# Patient Record
Sex: FEMALE | Race: WHITE | NOT HISPANIC OR LATINO | Employment: FULL TIME | URBAN - METROPOLITAN AREA
[De-identification: names, ages, dates, MRNs, and addresses within clinical notes are randomized per-mention and may not be internally consistent; named-entity substitution may affect disease eponyms.]

---

## 2024-10-01 ENCOUNTER — PATIENT ROUNDING (BHMG ONLY) (OUTPATIENT)
Dept: FAMILY MEDICINE CLINIC | Facility: CLINIC | Age: 59
End: 2024-10-01

## 2024-10-01 ENCOUNTER — OFFICE VISIT (OUTPATIENT)
Dept: FAMILY MEDICINE CLINIC | Facility: CLINIC | Age: 59
End: 2024-10-01
Payer: COMMERCIAL

## 2024-10-01 VITALS
DIASTOLIC BLOOD PRESSURE: 69 MMHG | HEART RATE: 107 BPM | WEIGHT: 148 LBS | HEIGHT: 67 IN | TEMPERATURE: 98.2 F | OXYGEN SATURATION: 94 % | BODY MASS INDEX: 23.23 KG/M2 | SYSTOLIC BLOOD PRESSURE: 97 MMHG

## 2024-10-01 DIAGNOSIS — Z13.220 SCREENING FOR CHOLESTEROL LEVEL: ICD-10-CM

## 2024-10-01 DIAGNOSIS — J30.1 SEASONAL ALLERGIC RHINITIS DUE TO POLLEN: Primary | ICD-10-CM

## 2024-10-01 DIAGNOSIS — Z13.0 SCREENING FOR DEFICIENCY ANEMIA: ICD-10-CM

## 2024-10-01 DIAGNOSIS — Z13.1 SCREENING FOR DIABETES MELLITUS: ICD-10-CM

## 2024-10-01 PROCEDURE — 99204 OFFICE O/P NEW MOD 45 MIN: CPT | Performed by: FAMILY MEDICINE

## 2024-10-01 RX ORDER — MAGNESIUM GLUCONATE 27 MG(500)
500 TABLET ORAL 2 TIMES DAILY
COMMUNITY

## 2024-10-01 RX ORDER — CHOLECALCIFEROL (VITAMIN D3) 25 MCG
1000 TABLET ORAL DAILY
COMMUNITY

## 2024-10-01 RX ORDER — PHENOL 1.4 %
600 AEROSOL, SPRAY (ML) MUCOUS MEMBRANE DAILY
COMMUNITY

## 2024-10-01 RX ORDER — MULTIPLE VITAMINS W/ MINERALS TAB 9MG-400MCG
1 TAB ORAL DAILY
COMMUNITY

## 2024-10-01 NOTE — PROGRESS NOTES
Subjective   Rozina Crook is a 59 y.o. female.   Chief Complaint   Patient presents with    [facility]       History of Present Illness   59 y.o. female presents to the office today as a new patient to me.  No prior records in the chart.  History of Present Illness  The patient is a 59-year-old female who presents to [facility].    She reports overall good health and infrequent doctor visits, typically only for blood work. She rarely falls ill.    Last summer, she experienced a persistent cough and sought treatment at an urgent care facility in [place], where she was prescribed Zithromax. Despite multiple negative COVID-19 tests, the cough persisted, leading her to believe it was bronchitis. A similar episode occurred a year prior, also treated with Zithromax.  The cough and other symptoms have subsequently resolved completely.    She has allergies but does not take any prescription medications. In December 2023, she developed a rash that lasted until April 2024, which worsened after taking doxycycline prescribed by Dr. Saul. A dermatologist, Dr. Victoria, diagnosed the rash as hives and suggested a possible allergy to doxycycline.    Her medical history includes a tubal ligation in 2022, tonsil and adenoid removal at age 3, and an osteochondroma removal from her left knee at age 20. Her gynecologist manages her Pap smears and mammograms. She completed a Cologuard test in 2023, which was negative.    Her total cholesterol was 201 last year, despite daily exercise and a low-fat diet. Her liver enzymes have been slightly elevated on several occasions, around 35 or 36. She consumes alcohol moderately and avoids Tylenol. Two years ago, she visited the ER due to concerns of a blood clot, but tests were negative. She believes her high glucose level was due to eating a cinnamon roll before the test. Her HDL was high and LDL was low. Her blood pressure has always been low.    SOCIAL HISTORY  She does not  "smoke.    FAMILY HISTORY  Her mother had blood clots when she had COVID-19.    ALLERGIES  She is allergic to DOXYCYCLINE and PENICILLIN.      Patient Active Problem List    Diagnosis Date Noted    Seasonal allergic rhinitis due to pollen 10/01/2024           Past Surgical History:   Procedure Laterality Date    ADENOIDECTOMY  1968    KNEE SURGERY Left     osteochondroma 20 or 22 yo    TONSILLECTOMY  1968    TUBAL ABDOMINAL LIGATION  March 2002     Current Outpatient Medications on File Prior to Visit   Medication Sig    calcium carbonate (OS-LENY) 600 MG tablet Take 1 tablet by mouth Daily.    Cholecalciferol 25 MCG (1000 UT) tablet Take 1 tablet by mouth Daily.    magnesium gluconate (MAGONATE) 500 MG tablet Take 1 tablet by mouth 2 (Two) Times a Day.    multivitamin with minerals tablet tablet Take 1 tablet by mouth Daily.     No current facility-administered medications on file prior to visit.     Allergies   Allergen Reactions    Doxycycline Anaphylaxis    Penicillins Anaphylaxis     Social History     Socioeconomic History    Marital status:    Tobacco Use    Smoking status: Never     Passive exposure: Never    Smokeless tobacco: Never   Vaping Use    Vaping status: Never Used   Substance and Sexual Activity    Alcohol use: Yes     Alcohol/week: 4.0 standard drinks of alcohol     Types: 4 Cans of beer per week    Drug use: Never    Sexual activity: Yes     Partners: Male     Family History   Problem Relation Age of Onset    Hearing loss Mother     Hyperlipidemia Mother        Review of Systems    Objective   BP 97/69 (BP Location: Right arm, Patient Position: Sitting, Cuff Size: Adult)   Pulse 107   Temp 98.2 °F (36.8 °C) (Infrared)   Ht 170.2 cm (67\")   Wt 67.1 kg (148 lb)   LMP  (Exact Date)   SpO2 94%   BMI 23.18 kg/m²   Physical Exam  Constitutional:       General: She is not in acute distress.     Appearance: She is well-developed.      Comments:      HENT:      Head: Normocephalic and " atraumatic.   Eyes:      Conjunctiva/sclera: Conjunctivae normal.   Cardiovascular:      Rate and Rhythm: Normal rate and regular rhythm.      Heart sounds: No murmur heard.  Pulmonary:      Effort: Pulmonary effort is normal. No respiratory distress.      Breath sounds: Normal breath sounds.   Musculoskeletal:         General: Normal range of motion.      Cervical back: Normal range of motion.      Right lower leg: No edema.      Left lower leg: No edema.   Skin:     General: Skin is warm and dry.      Findings: No rash.   Neurological:      Mental Status: She is alert and oriented to person, place, and time.      Gait: Gait normal.   Psychiatric:         Mood and Affect: Mood normal.         Behavior: Behavior normal.       Physical Exam  Heart sounds are normal.      No visits with results within 4 Month(s) from this visit.   Latest known visit with results is:   No results found for any previous visit.     Results  Laboratory Studies  Total cholesterol was 201. Liver enzymes slightly elevated around 35-36.    BMI cannot be calculated due to outdated height or weight values.  Please input a current height/weight in Vitals and re-renter BMIFOLLOWUP in Note to pull in correct documentation based on BMI range.     Assessment & Plan   Diagnoses and all orders for this visit:    1. Seasonal allergic rhinitis due to pollen (Primary)    2. Screening for cholesterol level  -     Lipid Panel    3. Screening for diabetes mellitus  -     Comprehensive Metabolic Panel  -     Hemoglobin A1c    4. Screening for deficiency anemia  -     CBC & Differential      Assessment & Plan  1. Allergic Rhinitis.  She reports having allergies which may exacerbate respiratory infections. No prescription medications are currently taken for this condition.     2. History of Bronchitis.  She experienced a persistent cough last summer, treated with Zithromax at an urgent care facility. A similar episode occurred a year prior, also treated with  Zithromax. No current symptoms of bronchitis are reported.    3. Elevated Cholesterol.  Her total cholesterol was 201 last year. She was advised to exercise more and avoid fatty foods, although she already exercises daily and avoids fatty foods. Screening for high cholesterol will be performed today.    4. Slightly Elevated Liver Enzymes.  She has had slightly elevated liver enzymes (35-36) in the past. She drinks alcohol occasionally and avoids Tylenol. Screening for liver disease will be performed today.    5. Health Maintenance.  She had a negative Cologuard test in 2023. Another Cologuard test is recommended in 2026. Blood work will be done today to screen for anemia, diabetes, kidney disease, liver disease, and high cholesterol.    Keep follow-up with her gynecologist for cervical cancer and breast cancer screening.  I will follow-up with her when the results of the above tests are back and we will see her again at least in 1 year or sooner if needed.      Call with any problems or concerns before next visit       Return in about 1 year (around 10/1/2025).  Patient or patient representative verbalized consent for the use of Ambient Listening during the visit with  Albina Bailey MD for chart documentation. 10/1/2024  12:44 EDT    Much of this report is an electronic transcription of spoken language to printed text using Dragon dictation software.  As such, the subtleties and finesse of spoken language may permit erroneous, or at times, nonsensical words or phrases to be inadvertently transcribed; thus changes may be made at a later date to rectify these errors.     Albina Bailey MD10/1/148798:44 EDT  This note has been electronically signed

## 2024-10-01 NOTE — PROGRESS NOTES
October 1, 2024    Hello, may I speak with Rozina Crook?    My name is BEATRIZ      I am  with [facility]  [facility]  [address]  [place] [address].    Before we get started may I verify your date of birth? 1965    I am calling to officially welcome you to our practice and ask about your recent visit. Is this a good time to talk? yes    Tell me about your visit with us. What things went well?  EVERYTHING WENT GREAT        We're always looking for ways to make our patients' experiences even better. Do you have recommendations on ways we may improve?  no    Overall were you satisfied with your first visit to our practice? yes       I appreciate you taking the time to speak with me today. Is there anything else I can do for you? no      Thank you, and have a great day.

## 2024-10-02 LAB
ALBUMIN SERPL-MCNC: 4.8 G/DL (ref 3.8–4.9)
ALP SERPL-CCNC: 81 IU/L (ref 44–121)
ALT SERPL-CCNC: 31 IU/L (ref 0–32)
AST SERPL-CCNC: 32 IU/L (ref 0–40)
BASOPHILS # BLD AUTO: 0.1 X10E3/UL (ref 0–0.2)
BASOPHILS NFR BLD AUTO: 1 %
BILIRUB SERPL-MCNC: 0.5 MG/DL (ref 0–1.2)
BUN SERPL-MCNC: 14 MG/DL (ref 8–27)
BUN/CREAT SERPL: 16 (ref 12–28)
CALCIUM SERPL-MCNC: 10.3 MG/DL (ref 8.7–10.3)
CHLORIDE SERPL-SCNC: 103 MMOL/L (ref 96–106)
CHOLEST SERPL-MCNC: 208 MG/DL (ref 100–199)
CO2 SERPL-SCNC: 23 MMOL/L (ref 20–29)
CREAT SERPL-MCNC: 0.9 MG/DL (ref 0.57–1)
EGFRCR SERPLBLD CKD-EPI 2021: 73 ML/MIN/1.73
EOSINOPHIL # BLD AUTO: 0.1 X10E3/UL (ref 0–0.4)
EOSINOPHIL NFR BLD AUTO: 2 %
ERYTHROCYTE [DISTWIDTH] IN BLOOD BY AUTOMATED COUNT: 12.5 % (ref 11.7–15.4)
GLOBULIN SER CALC-MCNC: 2.2 G/DL (ref 1.5–4.5)
GLUCOSE SERPL-MCNC: 99 MG/DL (ref 70–99)
HBA1C MFR BLD: 5.5 % (ref 4.8–5.6)
HCT VFR BLD AUTO: 43.1 % (ref 34–46.6)
HDLC SERPL-MCNC: 85 MG/DL
HGB BLD-MCNC: 14.4 G/DL (ref 11.1–15.9)
IMM GRANULOCYTES # BLD AUTO: 0 X10E3/UL (ref 0–0.1)
IMM GRANULOCYTES NFR BLD AUTO: 0 %
LDLC SERPL CALC-MCNC: 111 MG/DL (ref 0–99)
LYMPHOCYTES # BLD AUTO: 0.9 X10E3/UL (ref 0.7–3.1)
LYMPHOCYTES NFR BLD AUTO: 14 %
MCH RBC QN AUTO: 31.4 PG (ref 26.6–33)
MCHC RBC AUTO-ENTMCNC: 33.4 G/DL (ref 31.5–35.7)
MCV RBC AUTO: 94 FL (ref 79–97)
MONOCYTES # BLD AUTO: 0.4 X10E3/UL (ref 0.1–0.9)
MONOCYTES NFR BLD AUTO: 6 %
NEUTROPHILS # BLD AUTO: 4.8 X10E3/UL (ref 1.4–7)
NEUTROPHILS NFR BLD AUTO: 77 %
PLATELET # BLD AUTO: 219 X10E3/UL (ref 150–450)
POTASSIUM SERPL-SCNC: 4.9 MMOL/L (ref 3.5–5.2)
PROT SERPL-MCNC: 7 G/DL (ref 6–8.5)
RBC # BLD AUTO: 4.59 X10E6/UL (ref 3.77–5.28)
SODIUM SERPL-SCNC: 141 MMOL/L (ref 134–144)
TRIGL SERPL-MCNC: 69 MG/DL (ref 0–149)
VLDLC SERPL CALC-MCNC: 12 MG/DL (ref 5–40)
WBC # BLD AUTO: 6.2 X10E3/UL (ref 3.4–10.8)